# Patient Record
Sex: MALE | Race: WHITE | NOT HISPANIC OR LATINO | Employment: OTHER | URBAN - METROPOLITAN AREA
[De-identification: names, ages, dates, MRNs, and addresses within clinical notes are randomized per-mention and may not be internally consistent; named-entity substitution may affect disease eponyms.]

---

## 2017-01-02 ENCOUNTER — TRANSCRIBE ORDERS (OUTPATIENT)
Dept: LAB | Facility: CLINIC | Age: 67
End: 2017-01-02

## 2017-01-02 ENCOUNTER — APPOINTMENT (OUTPATIENT)
Dept: LAB | Facility: CLINIC | Age: 67
End: 2017-01-02
Payer: MEDICARE

## 2017-01-02 DIAGNOSIS — C67.9 MALIGNANT NEOPLASM OF OTHER SPECIFIED SITES OF BLADDER: Primary | ICD-10-CM

## 2017-01-02 DIAGNOSIS — I10 ESSENTIAL (PRIMARY) HYPERTENSION: ICD-10-CM

## 2017-01-02 DIAGNOSIS — N18.30 CHRONIC KIDNEY DISEASE, STAGE III (MODERATE) (HCC): ICD-10-CM

## 2017-01-02 LAB
ANION GAP SERPL CALCULATED.3IONS-SCNC: 5 MMOL/L (ref 4–13)
BILIRUB UR QL STRIP: NEGATIVE
BUN SERPL-MCNC: 30 MG/DL (ref 5–25)
CALCIUM SERPL-MCNC: 9.6 MG/DL (ref 8.3–10.1)
CHLORIDE SERPL-SCNC: 104 MMOL/L (ref 100–108)
CK SERPL-CCNC: 71 U/L (ref 39–308)
CLARITY UR: CLEAR
CO2 SERPL-SCNC: 30 MMOL/L (ref 21–32)
COLOR UR: YELLOW
CREAT SERPL-MCNC: 1.58 MG/DL (ref 0.6–1.3)
CREAT UR-MCNC: 163 MG/DL
ERYTHROCYTE [DISTWIDTH] IN BLOOD BY AUTOMATED COUNT: 14.1 % (ref 11.6–15.1)
GFR SERPL CREATININE-BSD FRML MDRD: 44 ML/MIN/1.73SQ M
GLUCOSE SERPL-MCNC: 107 MG/DL (ref 65–140)
GLUCOSE UR STRIP-MCNC: NEGATIVE MG/DL
HCT VFR BLD AUTO: 40.9 % (ref 36.5–49.3)
HGB BLD-MCNC: 13.3 G/DL (ref 12–17)
HGB UR QL STRIP.AUTO: NEGATIVE
KETONES UR STRIP-MCNC: NEGATIVE MG/DL
LEUKOCYTE ESTERASE UR QL STRIP: NEGATIVE
MCH RBC QN AUTO: 28.5 PG (ref 26.8–34.3)
MCHC RBC AUTO-ENTMCNC: 32.5 G/DL (ref 31.4–37.4)
MCV RBC AUTO: 88 FL (ref 82–98)
MICROALBUMIN UR-MCNC: 10.8 MG/L (ref 0–20)
MICROALBUMIN/CREAT 24H UR: 7 MG/G CREATININE (ref 0–30)
NITRITE UR QL STRIP: NEGATIVE
PH UR STRIP.AUTO: 6.5 [PH] (ref 4.5–8)
PLATELET # BLD AUTO: 145 THOUSANDS/UL (ref 149–390)
PMV BLD AUTO: 12.5 FL (ref 8.9–12.7)
POTASSIUM SERPL-SCNC: 4.2 MMOL/L (ref 3.5–5.3)
PROT UR STRIP-MCNC: NEGATIVE MG/DL
PSA SERPL-MCNC: <0.1 NG/ML (ref 0–4)
RBC # BLD AUTO: 4.66 MILLION/UL (ref 3.88–5.62)
SODIUM SERPL-SCNC: 139 MMOL/L (ref 136–145)
SP GR UR STRIP.AUTO: 1.02 (ref 1–1.03)
UROBILINOGEN UR QL STRIP.AUTO: 1 E.U./DL
WBC # BLD AUTO: 7.24 THOUSAND/UL (ref 4.31–10.16)

## 2017-01-02 PROCEDURE — G0103 PSA SCREENING: HCPCS

## 2017-01-02 PROCEDURE — 80048 BASIC METABOLIC PNL TOTAL CA: CPT

## 2017-01-02 PROCEDURE — 82570 ASSAY OF URINE CREATININE: CPT

## 2017-01-02 PROCEDURE — 81003 URINALYSIS AUTO W/O SCOPE: CPT

## 2017-01-02 PROCEDURE — 36415 COLL VENOUS BLD VENIPUNCTURE: CPT

## 2017-01-02 PROCEDURE — 82043 UR ALBUMIN QUANTITATIVE: CPT

## 2017-01-02 PROCEDURE — 85027 COMPLETE CBC AUTOMATED: CPT

## 2017-01-02 PROCEDURE — 82550 ASSAY OF CK (CPK): CPT

## 2017-01-03 ENCOUNTER — GENERIC CONVERSION - ENCOUNTER (OUTPATIENT)
Dept: OTHER | Facility: OTHER | Age: 67
End: 2017-01-03

## 2017-04-20 ENCOUNTER — ALLSCRIPTS OFFICE VISIT (OUTPATIENT)
Dept: OTHER | Facility: OTHER | Age: 67
End: 2017-04-20

## 2017-04-20 DIAGNOSIS — I10 ESSENTIAL (PRIMARY) HYPERTENSION: ICD-10-CM

## 2017-04-20 DIAGNOSIS — N18.30 CHRONIC KIDNEY DISEASE, STAGE III (MODERATE) (HCC): ICD-10-CM

## 2017-04-20 DIAGNOSIS — I71.4 ABDOMINAL AORTIC ANEURYSM WITHOUT RUPTURE (HCC): ICD-10-CM

## 2017-04-20 DIAGNOSIS — E78.5 HYPERLIPIDEMIA: ICD-10-CM

## 2017-04-21 ENCOUNTER — GENERIC CONVERSION - ENCOUNTER (OUTPATIENT)
Dept: OTHER | Facility: OTHER | Age: 67
End: 2017-04-21

## 2017-04-21 ENCOUNTER — APPOINTMENT (OUTPATIENT)
Dept: LAB | Facility: CLINIC | Age: 67
End: 2017-04-21
Payer: MEDICARE

## 2017-04-21 DIAGNOSIS — E78.5 HYPERLIPIDEMIA: ICD-10-CM

## 2017-04-21 DIAGNOSIS — I10 ESSENTIAL (PRIMARY) HYPERTENSION: ICD-10-CM

## 2017-04-21 DIAGNOSIS — N18.30 CHRONIC KIDNEY DISEASE, STAGE III (MODERATE) (HCC): ICD-10-CM

## 2017-04-21 DIAGNOSIS — I71.4 ABDOMINAL AORTIC ANEURYSM WITHOUT RUPTURE (HCC): ICD-10-CM

## 2017-04-21 LAB
ALBUMIN SERPL BCP-MCNC: 3.4 G/DL (ref 3.5–5)
ALP SERPL-CCNC: 78 U/L (ref 46–116)
ALT SERPL W P-5'-P-CCNC: 48 U/L (ref 12–78)
ANION GAP SERPL CALCULATED.3IONS-SCNC: 5 MMOL/L (ref 4–13)
AST SERPL W P-5'-P-CCNC: 16 U/L (ref 5–45)
BASOPHILS # BLD AUTO: 0.02 THOUSANDS/ΜL (ref 0–0.1)
BASOPHILS NFR BLD AUTO: 0 % (ref 0–1)
BILIRUB SERPL-MCNC: 0.53 MG/DL (ref 0.2–1)
BUN SERPL-MCNC: 22 MG/DL (ref 5–25)
CALCIUM SERPL-MCNC: 9.2 MG/DL (ref 8.3–10.1)
CHLORIDE SERPL-SCNC: 103 MMOL/L (ref 100–108)
CHOLEST SERPL-MCNC: 146 MG/DL (ref 50–200)
CO2 SERPL-SCNC: 30 MMOL/L (ref 21–32)
CREAT SERPL-MCNC: 1.41 MG/DL (ref 0.6–1.3)
EOSINOPHIL # BLD AUTO: 0.24 THOUSAND/ΜL (ref 0–0.61)
EOSINOPHIL NFR BLD AUTO: 3 % (ref 0–6)
ERYTHROCYTE [DISTWIDTH] IN BLOOD BY AUTOMATED COUNT: 13.8 % (ref 11.6–15.1)
GFR SERPL CREATININE-BSD FRML MDRD: 50.1 ML/MIN/1.73SQ M
GLUCOSE P FAST SERPL-MCNC: 97 MG/DL (ref 65–99)
HCT VFR BLD AUTO: 42.4 % (ref 36.5–49.3)
HDLC SERPL-MCNC: 34 MG/DL (ref 40–60)
HGB BLD-MCNC: 13.5 G/DL (ref 12–17)
LDLC SERPL CALC-MCNC: 59 MG/DL (ref 0–100)
LYMPHOCYTES # BLD AUTO: 3.04 THOUSANDS/ΜL (ref 0.6–4.47)
LYMPHOCYTES NFR BLD AUTO: 36 % (ref 14–44)
MCH RBC QN AUTO: 28.8 PG (ref 26.8–34.3)
MCHC RBC AUTO-ENTMCNC: 31.8 G/DL (ref 31.4–37.4)
MCV RBC AUTO: 90 FL (ref 82–98)
MONOCYTES # BLD AUTO: 0.73 THOUSAND/ΜL (ref 0.17–1.22)
MONOCYTES NFR BLD AUTO: 9 % (ref 4–12)
NEUTROPHILS # BLD AUTO: 4.51 THOUSANDS/ΜL (ref 1.85–7.62)
NEUTS SEG NFR BLD AUTO: 52 % (ref 43–75)
NRBC BLD AUTO-RTO: 0 /100 WBCS
PLATELET # BLD AUTO: 146 THOUSANDS/UL (ref 149–390)
PMV BLD AUTO: 12.6 FL (ref 8.9–12.7)
POTASSIUM SERPL-SCNC: 4.3 MMOL/L (ref 3.5–5.3)
PROT SERPL-MCNC: 7.1 G/DL (ref 6.4–8.2)
RBC # BLD AUTO: 4.69 MILLION/UL (ref 3.88–5.62)
SODIUM SERPL-SCNC: 138 MMOL/L (ref 136–145)
TRIGL SERPL-MCNC: 267 MG/DL
TSH SERPL DL<=0.05 MIU/L-ACNC: 0.93 UIU/ML (ref 0.36–3.74)
WBC # BLD AUTO: 8.56 THOUSAND/UL (ref 4.31–10.16)

## 2017-04-21 PROCEDURE — 84443 ASSAY THYROID STIM HORMONE: CPT

## 2017-04-21 PROCEDURE — 36415 COLL VENOUS BLD VENIPUNCTURE: CPT

## 2017-04-21 PROCEDURE — 80061 LIPID PANEL: CPT

## 2017-04-21 PROCEDURE — 85025 COMPLETE CBC W/AUTO DIFF WBC: CPT

## 2017-04-21 PROCEDURE — 80053 COMPREHEN METABOLIC PANEL: CPT

## 2017-06-22 ENCOUNTER — HOSPITAL ENCOUNTER (OUTPATIENT)
Dept: NON INVASIVE DIAGNOSTICS | Facility: CLINIC | Age: 67
Discharge: HOME | End: 2017-06-22
Payer: MEDICARE

## 2017-06-22 DIAGNOSIS — I70.219 ATHEROSCLEROSIS OF NATIVE ARTERIES OF EXTREMITY WITH INTERMITTENT CLAUDICATION (HCC): ICD-10-CM

## 2017-06-22 DIAGNOSIS — I71.4 ABDOMINAL AORTIC ANEURYSM WITHOUT RUPTURE (HCC): ICD-10-CM

## 2017-06-22 DIAGNOSIS — I65.21 OCCLUSION AND STENOSIS OF RIGHT CAROTID ARTERY: ICD-10-CM

## 2017-06-22 DIAGNOSIS — I70.1 ATHEROSCLEROSIS OF RENAL ARTERY (HCC): ICD-10-CM

## 2017-06-22 PROCEDURE — 93975 VASCULAR STUDY: CPT

## 2017-06-22 PROCEDURE — 93880 EXTRACRANIAL BILAT STUDY: CPT

## 2017-06-22 PROCEDURE — 93923 UPR/LXTR ART STDY 3+ LVLS: CPT

## 2017-06-22 PROCEDURE — 93978 VASCULAR STUDY: CPT

## 2017-06-22 PROCEDURE — 93925 LOWER EXTREMITY STUDY: CPT

## 2017-08-21 ENCOUNTER — GENERIC CONVERSION - ENCOUNTER (OUTPATIENT)
Dept: OTHER | Facility: OTHER | Age: 67
End: 2017-08-21

## 2017-09-08 ENCOUNTER — GENERIC CONVERSION - ENCOUNTER (OUTPATIENT)
Dept: OTHER | Facility: OTHER | Age: 67
End: 2017-09-08

## 2017-12-23 DIAGNOSIS — I70.219 ATHEROSCLEROSIS OF NATIVE ARTERIES OF EXTREMITY WITH INTERMITTENT CLAUDICATION (HCC): ICD-10-CM

## 2017-12-23 DIAGNOSIS — I71.4 ABDOMINAL AORTIC ANEURYSM WITHOUT RUPTURE (HCC): ICD-10-CM

## 2017-12-23 DIAGNOSIS — I65.21 OCCLUSION AND STENOSIS OF RIGHT CAROTID ARTERY: ICD-10-CM

## 2017-12-23 DIAGNOSIS — I70.1 ATHEROSCLEROSIS OF RENAL ARTERY (HCC): ICD-10-CM

## 2017-12-23 DIAGNOSIS — I15.0 RENOVASCULAR HYPERTENSION: ICD-10-CM

## 2018-01-09 NOTE — RESULT NOTES
Verified Results  (1) CBC/PLT/DIFF 21Apr2017 07:27AM Caprice Owen    Order Number: [id number]     Test Name Result Flag Reference   WBC COUNT 8 56 Thousand/uL  4 31-10 16   RBC COUNT 4 69 Million/uL  3 88-5 62   HEMOGLOBIN 13 5 g/dL  12 0-17 0   HEMATOCRIT 42 4 %  36 5-49 3   MCV 90 fL  82-98   MCH 28 8 pg  26 8-34 3   MCHC 31 8 g/dL  31 4-37 4   RDW 13 8 %  11 6-15 1   MPV 12 6 fL  8 9-12 7   PLATELET COUNT 376 Thousands/uL L 149-390   nRBC AUTOMATED 0 /100 WBCs     NEUTROPHILS RELATIVE PERCENT 52 %  43-75   LYMPHOCYTES RELATIVE PERCENT 36 %  14-44   MONOCYTES RELATIVE PERCENT 9 %  4-12   EOSINOPHILS RELATIVE PERCENT 3 %  0-6   BASOPHILS RELATIVE PERCENT 0 %  0-1   NEUTROPHILS ABSOLUTE COUNT 4 51 Thousands/? ??L  1 85-7 62   LYMPHOCYTES ABSOLUTE COUNT 3 04 Thousands/? ??L  0 60-4 47   MONOCYTES ABSOLUTE COUNT 0 73 Thousand/? ??L  0 17-1 22   EOSINOPHILS ABSOLUTE COUNT 0 24 Thousand/? ??L  0 00-0 61   BASOPHILS ABSOLUTE COUNT 0 02 Thousands/? ??L  0 00-0 10     (1) COMPREHENSIVE METABOLIC PANEL [id number] 48:80PY Karyna Ameya Order Number: [id number]     Test Name Result Flag Reference   SODIUM 138 mmol/L  136-145   POTASSIUM 4 3 mmol/L  3 5-5 3   CHLORIDE 103 mmol/L  100-108   CARBON DIOXIDE 30 mmol/L  21-32   ANION GAP (CALC) 5 mmol/L  4-13   BLOOD UREA NITROGEN 22 mg/dL  5-25   CREATININE 1 41 mg/dL H 0 60-1 30   Standardized to IDMS reference method   CALCIUM 9 2 mg/dL  8 3-10 1   BILI, TOTAL 0 53 mg/dL  0 20-1 00   ALK PHOSPHATAS 78 U/L     ALT (SGPT) 48 U/L  12-78   AST(SGOT) 16 U/L  5-45   ALBUMIN 3 4 g/dL L 3 5-5 0   TOTAL PROTEIN 7 1 g/dL  6 4-8 2   eGFR Non-African American 50 1 ml/min/1 73sq m     National Kidney Disease Education Program recommendations are as follows:  GFR calculation is accurate only with a steady state creatinine  Chronic Kidney disease less than 60 ml/min/1 73 sq  meters  Kidney failure less than 15 ml/min/1 73 sq  meters     GLUCOSE FASTING 97 mg/dL  65-99     (1) LIPID PANEL, FASTING 21Apr2017 07:27AM [employer] Order Number: [id number]     Test Name Result Flag Reference   CHOLESTEROL 146 mg/dL     HDL,DIRECT 34 mg/dL L 40-60   Specimen collection should occur prior to Metamizole administration due to the potential for falsely depressed results  LDL CHOLESTEROL CALCULATED 59 mg/dL  0-100   Triglyceride:         Normal              <150 mg/dl       Borderline High    150-199 mg/dl       High               200-499 mg/dl       Very High          >499 mg/dl  Cholesterol:         Desirable        <200 mg/dl      Borderline High  200-239 mg/dl      High             >239 mg/dl  HDL Cholesterol:        High    >59 mg/dL      Low     <41 mg/dL  LDL CALCULATED:    This screening LDL is a calculated result  It does not have the accuracy of the Direct Measured LDL in the monitoring of patients with hyperlipidemia and/or statin therapy  Direct Measure LDL (FKS489) must be ordered separately in these patients  TRIGLYCERIDES 267 mg/dL H <=150   Specimen collection should occur prior to N-Acetylcysteine or Metamizole administration due to the potential for falsely depressed results  (1) TSH 21Apr2017 07:27AM [employer] Order Number: [id number]     Test Name Result Flag Reference   TSH 0 933 uIU/mL  0 358-3 740   Patients undergoing fluorescein dye angiography may retain small amounts of fluorescein in the body for 48-72 hours post procedure  Samples containing fluorescein can produce falsely depressed TSH values  If the patient had this procedure,a specimen should be resubmitted post fluorescein clearance

## 2018-01-12 NOTE — MISCELLANEOUS
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)   5  Atherosclerosis of lower extremity with claudication (440 21) (I70 219)   6  Carotid stenosis, right (433 10) (I65 21)   7  CKD (chronic kidney disease), stage III (585 3) (N18 3)   8  Hyperlipidemia (272 4) (E78 5)   9  Hypertension (401 9) (I10)   10  Obstructive sleep apnea (327 23) (G47 33)   11  Occlusion and stenosis of carotid artery (433 10) (I65 29)   12  Renal artery stenosis (440 1) (I70 1)   13  Renovascular hypertension (405 91) (I15 0)   14  Right carpal tunnel syndrome (354 0) (G56 01)   15  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)    Current Meds   1  Aspirin 81 MG TABS; Therapy: (Recorded:14Jan2016) to Recorded   2  Atenolol 50 MG Oral Tablet; Take 1 tablet daily; Therapy: 47RXQ3816 to (Evaluate:15Apr2018)  Requested for: 20Apr2017; Last   Rx:20Apr2017 Ordered   3  Atorvastatin Calcium 10 MG Oral Tablet; TAKE ONE TABLET BY MOUTH AT BEDTIME; Therapy: 44YPA9134 to (Evaluate:15Oct2017)  Requested for: 30AJK7053; Last   Rx:20Oct2016 Ordered   4  Co Q 10 10 MG Oral Capsule; 1 DAILY Recorded   5  Daily Multiple Vitamins TABS; TAKE 1 TABLET DAILY; Therapy: (Recorded:04Hrp3007) to Recorded   6  Fish Oil CAPS; Therapy: [phone]) to Recorded   7  Glucosamine HCl - 1500 MG Oral Tablet; Therapy: (Rayshawn Bell) to Recorded   8  Losartan Potassium-HCTZ 100-25 MG Oral Tablet; TAKE 1 TABLET BY MOUTH EVERY   DAY;    Therapy: 18Apr2011 to (Evaluate:15Oct2017)  Requested for: 08JLT0005; Last   Rx:20Oct2016 Ordered   9  Vitamin B12 TABS; TAKE 1 TABLET DAILY AS DIRECTED; Therapy: (Recorded:20Sep2013) to Recorded    Allergies    1   Cipro TABS    Signatures   Electronically signed by : Lilyan Lennox, ; Aug 21 2017 11:35AM EST                       (Author)

## 2018-01-12 NOTE — MISCELLANEOUS
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)   5  Atherosclerosis of lower extremity with claudication (440 21) (I70 219)   6  Carotid stenosis, right (433 10) (I65 21)   7  CKD (chronic kidney disease), stage III (585 3) (N18 3)   8  Hyperlipidemia (272 4) (E78 5)   9  Hypertension (401 9) (I10)   10  Obstructive sleep apnea (327 23) (G47 33)   11  Occlusion and stenosis of carotid artery (433 10) (I65 29)   12  Renal artery stenosis (440 1) (I70 1)   13  Renovascular hypertension (405 91) (I15 0)   14  Right carpal tunnel syndrome (354 0) (G56 01)   15  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)    Current Meds   1  Aspirin 81 MG TABS; Therapy: (Recorded:14Jan2016) to Recorded   2  Atenolol 50 MG Oral Tablet; Take 1 tablet daily; Therapy: 87MSW1664 to (Evaluate:15Apr2018)  Requested for: 20Apr2017; Last   Rx:20Apr2017 Ordered   3  Atorvastatin Calcium 10 MG Oral Tablet; TAKE ONE TABLET BY MOUTH AT BEDTIME; Therapy: 61YYY6550 to (Evaluate:15Oct2017)  Requested for: 66PKS9277; Last   Rx:20Oct2016 Ordered   4  Co Q 10 10 MG Oral Capsule; 1 DAILY Recorded   5  Daily Multiple Vitamins TABS; TAKE 1 TABLET DAILY; Therapy: (Recorded:17Eyt1471) to Recorded   6  Fish Oil CAPS; Therapy: ([phone]) to Recorded   7  Glucosamine HCl - 1500 MG Oral Tablet; Therapy: (Karen Smyth) to Recorded   8  Losartan Potassium-HCTZ 100-25 MG Oral Tablet; TAKE 1 TABLET BY MOUTH EVERY   DAY; Therapy: 22Ssx8381 to (Evaluate:15Oct2017)  Requested for: 13YZW8562; Last   Rx:20Oct2016 Ordered   9  Vitamin B12 TABS; TAKE 1 TABLET DAILY AS DIRECTED; Therapy: (Recorded:06Scx1249) to Recorded    Allergies    1   Cipro TABS    Plan  Aneurysm of abdominal aorta    · VAS ABDOMINAL AORTA/ILIACS; COMPLETE STUDY; Status:Active - Retrospective By  Protocol Authorization; Requested for:[id number]; Atherosclerosis of lower extremity with claudication    · VAS LOWER LIMB ARTERIAL DUPLEX, COMPLETE BILATERAL/GRAFTS;  SIDE:Bilateral; Status:Active; Requested for:68Trq5874;   Carotid stenosis, right    · VAS CAROTID COMPLETE STUDY; SIDE:Bilateral; Status:Active; Requested  for:72Dqz2228;   Renal artery stenosis    · VAS RENAL ARTERY COMPLETE BILATERAL; Status:Active;  Requested  for:61Fot1245;     Signatures   Electronically signed by : Adan Sweeney, ; Sep  8 2017  2:18PM EST                       (Author)

## 2018-01-14 VITALS
HEART RATE: 70 BPM | HEIGHT: 69 IN | WEIGHT: 251.6 LBS | SYSTOLIC BLOOD PRESSURE: 124 MMHG | BODY MASS INDEX: 37.26 KG/M2 | TEMPERATURE: 96.3 F | RESPIRATION RATE: 18 BRPM | DIASTOLIC BLOOD PRESSURE: 82 MMHG

## 2018-01-16 NOTE — RESULT NOTES
Verified Results  (1) CBC/PLT/DIFF 24Oct2016 07:42AM Keysha Alegria    Order Number: [id number]     Test Name Result Flag Reference   WBC COUNT 7 34 Thousand/uL  4 31-10 16   RBC COUNT 5 01 Million/uL  3 88-5 62   HEMOGLOBIN 14 4 g/dL  12 0-17 0   HEMATOCRIT 43 6 %  36 5-49 3   MCV 87 fL  82-98   MCH 28 7 pg  26 8-34 3   MCHC 33 0 g/dL  31 4-37 4   RDW 13 7 %  11 6-15 1   MPV 12 2 fL  8 9-12 7   PLATELET COUNT 442 Thousands/uL L 149-390   nRBC AUTOMATED 0 /100 WBCs     NEUTROPHILS RELATIVE PERCENT 54 %  43-75   LYMPHOCYTES RELATIVE PERCENT 35 %  14-44   MONOCYTES RELATIVE PERCENT 8 %  4-12   EOSINOPHILS RELATIVE PERCENT 3 %  0-6   BASOPHILS RELATIVE PERCENT 0 %  0-1   NEUTROPHILS ABSOLUTE COUNT 3 95 Thousands/?L  1 85-7 62   LYMPHOCYTES ABSOLUTE COUNT 2 55 Thousands/?L  0 60-4 47   MONOCYTES ABSOLUTE COUNT 0 61 Thousand/?L  0 17-1 22   EOSINOPHILS ABSOLUTE COUNT 0 20 Thousand/?L  0 00-0 61   BASOPHILS ABSOLUTE COUNT 0 02 Thousands/?L  0 00-0 10   - Patient Instructions: This bloodwork is non-fasting  Please drink two glasses of water morning of bloodwork  - Patient Instructions: This bloodwork is non-fasting  Please drink two glasses of water morning of bloodwork  (1) COMPREHENSIVE METABOLIC PANEL 23WAU1209 00:82RS Jessie Lopez Order Number: [id number]     Test Name Result Flag Reference   GLUCOSE,RANDM 105 mg/dL     If the patient is fasting, the ADA then defines impaired fasting glucose as > 100 mg/dL and diabetes as > or equal to 123 mg/dL     SODIUM 140 mmol/L  136-145   POTASSIUM 4 3 mmol/L  3 5-5 3   CHLORIDE 105 mmol/L  100-108   CARBON DIOXIDE 29 mmol/L  21-32   ANION GAP (CALC) 6 mmol/L  4-13   BLOOD UREA NITROGEN 25 mg/dL  5-25   CREATININE 1 36 mg/dL H 0 60-1 30   Standardized to IDMS reference method   CALCIUM 9 5 mg/dL  8 3-10 1   BILI, TOTAL 0 75 mg/dL  0 20-1 00   ALK PHOSPHATAS 74 U/L     ALT (SGPT) 28 U/L  12-78   AST(SGOT) 13 U/L  5-45 ALBUMIN 3 6 g/dL  3 5-5 0   TOTAL PROTEIN 7 3 g/dL  6 4-8 2   eGFR Non-African American 52 4 ml/min/1 73sq m     - Patient Instructions: This is a fasting blood test  Water,black tea or black  coffee only after 9:00pm the night before test Drink 2 glasses of water the morning of test - Patient Instructions: This bloodwork is non-fasting  Please drink two glasses of   water morning of bloodwork  National Kidney Disease Education Program recommendations are as follows:  GFR calculation is accurate only with a steady state creatinine  Chronic Kidney disease less than 60 ml/min/1 73 sq  meters  Kidney failure less than 15 ml/min/1 73 sq  meters  (1) HEMOGLOBIN A1C 24Oct2016 07:42AM Jennifer Closs Order Number: [id number]     Test Name Result Flag Reference   HEMOGLOBIN A1C 6 0 %  4 2-6 3   EST  AVG  GLUCOSE 126 mg/dl       (1) LIPID PANEL, FASTING 24Oct2016 07:42AM [facility] Number: [id number]     Test Name Result Flag Reference   CHOLESTEROL 154 mg/dL     HDL,DIRECT 41 mg/dL  40-60   Specimen collection should occur prior to Metamizole administration due to the potential for falsely depressed results  LDL CHOLESTEROL CALCULATED 78 mg/dL  0-100   - Patient Instructions: This is a fasting blood test  Water,black tea or black  coffee only after 9:00pm the night before test   Drink 2 glasses of water the morning of test     - Patient Instructions: This is a fasting blood test  Water,black tea or black  coffee only after 9:00pm the night before test Drink 2 glasses of water the morning of test - Patient Instructions: This bloodwork is non-fasting  Please drink two glasses of   water morning of bloodwork    Triglyceride:         Normal              <150 mg/dl       Borderline High    150-199 mg/dl       High               200-499 mg/dl       Very High          >499 mg/dl  Cholesterol:         Desirable        <200 mg/dl      Borderline High  200-239 mg/dl High             >239 mg/dl  HDL Cholesterol:        High    >59 mg/dL      Low     <41 mg/dL  LDL CALCULATED:    This screening LDL is a calculated result  It does not have the accuracy of the Direct Measured LDL in the monitoring of patients with hyperlipidemia and/or statin therapy  Direct Measure LDL (QWJ920) must be ordered separately in these patients  TRIGLYCERIDES 176 mg/dL H <=150   Specimen collection should occur prior to N-Acetylcysteine or Metamizole administration due to the potential for falsely depressed results  (1) TSH 24Oct2016 07:42AM Stu Trevino    Order Number: [id number]     Test Name Result Flag Reference   TSH 0 944 uIU/mL  0 358-3 740   - Patient Instructions: This bloodwork is non-fasting  Please drink two glasses of water morning of bloodwork  - Patient Instructions: This is a fasting blood test  Water,black tea or black  coffee only after 9:00pm the night before test Drink 2 glasses of water the morning of test - Patient Instructions: This bloodwork is non-fasting  Please drink two glasses of   water morning of bloodwork  Patients undergoing fluorescein dye angiography may retain small amounts of fluorescein in the body for 48-72 hours post procedure  Samples containing fluorescein can produce falsely depressed TSH values  If the patient had this procedure,a specimen should be resubmitted post fluorescein clearance  (1) PSA (SCREEN) (Dx V76 44 Screen for Prostate Cancer) 24Oct2016 07:42AM Whit White Order Number: [id number]     Test Name Result Flag Reference   PROSTATE SPECIFIC ANTIGEN <0 1 ng/mL  0 0-4 0   American Urological Association Guidelines define biochemical recurrence of prostate cancer as a detectable or rising PSA value post-radical prostatectomy that is greater than or equal to 0 2 ng/mL with a second confirmatory level of greater than or equal to 0 2 ng/mL  - Patient Instructions:  This test is non-fasting  Please drink two glasses of water morning of bloodwork  - Patient Instructions: This test is non-fasting  Please drink two glasses of water morning of bloodwork       (1) URINALYSIS (will reflex a microscopy if leukocytes, occult blood, protein or nitrites are not within normal limits) 24Oct2016 07:42AM Daniel Alex   [facility] Order Number: [id number]     Test Name Result Flag Reference   COLOR Yellow     CLARITY Clear     SPECIFIC GRAVITY UA 1 023  1 003-1 030   PH UA 6 0  4 5-8 0   LEUKOCYTE ESTERASE UA Negative  Negative   NITRITE UA Negative  Negative   PROTEIN UA 30 (1+) mg/dl A Negative   GLUCOSE UA Negative mg/dl  Negative   KETONES UA Negative mg/dl  Negative   UROBILINOGEN UA 0 2 E U /dl  0 2, 1 0 E U /dl   BILIRUBIN UA Negative  Negative   BLOOD UA Negative  Negative   BACTERIA None Seen /hpf  None Seen, Occasional   EPITHELIAL CELLS None Seen /hpf  None Seen, Occasional   RBC UA None Seen /hpf  None Seen   WBC UA None Seen /hpf  None Seen